# Patient Record
Sex: MALE | Race: WHITE | NOT HISPANIC OR LATINO | URBAN - METROPOLITAN AREA
[De-identification: names, ages, dates, MRNs, and addresses within clinical notes are randomized per-mention and may not be internally consistent; named-entity substitution may affect disease eponyms.]

---

## 2017-01-01 ENCOUNTER — EMERGENCY (EMERGENCY)
Facility: HOSPITAL | Age: 82
LOS: 1 days | End: 2017-01-01
Attending: EMERGENCY MEDICINE
Payer: COMMERCIAL

## 2017-01-01 VITALS — TEMPERATURE: 97 F

## 2017-01-01 DIAGNOSIS — I46.9 CARDIAC ARREST, CAUSE UNSPECIFIED: ICD-10-CM

## 2017-01-01 PROCEDURE — 92950 HEART/LUNG RESUSCITATION CPR: CPT

## 2017-01-01 PROCEDURE — 99285 EMERGENCY DEPT VISIT HI MDM: CPT | Mod: 25

## 2017-03-03 NOTE — ED PROVIDER NOTE - OBJECTIVE STATEMENT
Code Blue called overhead at 1145. 87 y/o male BIB [facility] from [facility] in a state of cardiac arrest, intubated with [place] device in place. Per EMS, pt was found down by staff with an approximate downtime of 15 minutes. CPR was initiated by nursing home staff prior to EMS arrival on scene. En route to [facility], pt in PEA on cardiac monitor without any shockable rhythms.  in the field. IO access was established and pt was given 5 epi and 1 bicarb. Pt arrived at [facility] at 1151, with total downtime of ~35 minutes. Per EMS, pt has a history of Afib, aortic insufficiency, and pacemaker. At presentation to ER, pt is unresponsive with GCS 3, fixed and dilated pupils. No spontaneous heart sounds and no pulse. Pt given multiple doses of epinephrine without response. Pt remained in PEA throughout resuscitation by EMS and in ER.

## 2017-03-03 NOTE — ED ADULT TRIAGE NOTE - CHIEF COMPLAINT QUOTE
Pt BIBA in cardiac arrest.  As per EMS pt in PEA for approx 35 minutes.  Pt rec'd 5 Epi and 1 amp of bicarb en route.  Pt intubated upon arrival.  7.5 tube, 26 at lip. I/O access in right tibia.

## 2017-03-03 NOTE — ED PROVIDER NOTE - MEDICAL DECISION MAKING DETAILS
Attempted resuscitation, pt given 2 rounds of epi, Narcan, bicarb, FS was checked. Tube confirmed with direct visualization and pulled back from 28 to 23 cm. Temperature found to be 97.2F rectally. IV fluids initiated. Pt found to have no cardiac activity on bedside echo. After second pulse check, TOD was March 4th, 2017 at 0001. Will call ME. Nursing will alert pt's family.

## 2017-03-03 NOTE — ED PROVIDER NOTE - DETAILS:
The history, relevant review of systems, past medical and surgical history, medical decision making, and physical examination was documented by the scribe in my presence and I attest to the accuracy of the documentation.    pt in acute PEA arrest without ROSC by EMS. pt with extensive cardiac hx. "H's and T's" addressed and reversible causes of cardiac arrest we attempted to reverse without success. No cardiac wall motion in patient. BLS TOR was used and resucitation was terminated at 1201 AM

## 2017-03-03 NOTE — ED PROVIDER NOTE - NS ED MD SCRIBE ATTENDING SCRIBE SECTIONS
VITAL SIGNS( Pullset)/REVIEW OF SYSTEMS/HISTORY OF PRESENT ILLNESS/DISPOSITION/PHYSICAL EXAM/PAST MEDICAL/SURGICAL/SOCIAL HISTORY

## 2017-03-03 NOTE — ED PROVIDER NOTE - UNABLE TO OBTAIN
Unresponsive Pt in cardiac arrest. Unable to obtain history. Pt in cardiac arrest. Unable to obtain ROS.

## 2017-03-03 NOTE — ED PROVIDER NOTE - CRITICAL CARE PROVIDED
interpretation of diagnostic studies/additional history taking/direct patient care (not related to procedure)/documentation

## 2017-03-04 NOTE — DISCHARGE NOTE FOR THE EXPIRED PATIENT - REASON FOR ADMISSION
pt found by ems unresponsive, PEA, intubated with 7.5, 23 at the [facility], RLE IO, ACLS meds admin, CPR in progress

## 2017-03-04 NOTE — DISCHARGE NOTE FOR THE EXPIRED PATIENT - HOSPITAL COURSE
pt biba, intubated, CPR in progress, PEA, acls meds admin, sono shows no cardiac activity,  at 0001, Shayna - Nursing Supervisor at [facility] made aware, she will contact pts family and pts doctor, sent to ann at [phone]

## 2017-03-04 NOTE — RESPIRATORY CARE EMERGENCY NOTE - CAC RESPIRATORY COMMENTS
code blue. pt came intubated 7.5et tube secured 23lip line. tube confirmed by md and rrt. cpr machine in place and ambu pt. pt doa.

## 2018-02-28 NOTE — ED PROVIDER NOTE - DATE/TIME 1
Pt inquiring about when chest tube and pacer wires will be removed. Per \[facility]\"" NP this AM CTS to pull both today. CTS paged to find out when chest tubes and pacer wires will be removed. Await call back   04-Mar-2017 00:46
